# Patient Record
Sex: FEMALE | Race: BLACK OR AFRICAN AMERICAN | NOT HISPANIC OR LATINO | URBAN - METROPOLITAN AREA
[De-identification: names, ages, dates, MRNs, and addresses within clinical notes are randomized per-mention and may not be internally consistent; named-entity substitution may affect disease eponyms.]

---

## 2023-02-27 ENCOUNTER — OFFICE VISIT (OUTPATIENT)
Dept: URGENT CARE | Facility: CLINIC | Age: 23
End: 2023-02-27
Payer: MEDICAID

## 2023-02-27 VITALS
BODY MASS INDEX: 32.6 KG/M2 | HEIGHT: 63 IN | SYSTOLIC BLOOD PRESSURE: 119 MMHG | RESPIRATION RATE: 16 BRPM | WEIGHT: 184 LBS | OXYGEN SATURATION: 98 % | HEART RATE: 125 BPM | TEMPERATURE: 101 F | DIASTOLIC BLOOD PRESSURE: 64 MMHG

## 2023-02-27 DIAGNOSIS — R09.89 SYMPTOMS OF UPPER RESPIRATORY INFECTION (URI): Primary | ICD-10-CM

## 2023-02-27 LAB
CTP QC/QA: YES
FLUAV AG UPPER RESP QL IA.RAPID: NOT DETECTED
FLUBV AG UPPER RESP QL IA.RAPID: NOT DETECTED
MOLECULAR STREP A: POSITIVE
RSV A 5' UTR RNA NPH QL NAA+PROBE: NOT DETECTED
SARS-COV-2 RNA RESP QL NAA+PROBE: NOT DETECTED

## 2023-02-27 PROCEDURE — 0241U COVID/RSV/FLU A&B PCR: CPT | Performed by: NURSE PRACTITIONER

## 2023-02-27 PROCEDURE — 87651 STREP A DNA AMP PROBE: CPT | Mod: PBBFAC | Performed by: NURSE PRACTITIONER

## 2023-02-27 PROCEDURE — 99214 OFFICE O/P EST MOD 30 MIN: CPT | Mod: S$PBB,,, | Performed by: NURSE PRACTITIONER

## 2023-02-27 PROCEDURE — 99214 PR OFFICE/OUTPT VISIT, EST, LEVL IV, 30-39 MIN: ICD-10-PCS | Mod: S$PBB,,, | Performed by: NURSE PRACTITIONER

## 2023-02-27 PROCEDURE — 99214 OFFICE O/P EST MOD 30 MIN: CPT | Mod: PBBFAC | Performed by: NURSE PRACTITIONER

## 2023-02-27 RX ORDER — AMOXICILLIN 875 MG/1
875 TABLET, FILM COATED ORAL EVERY 12 HOURS
Qty: 20 TABLET | Refills: 0 | Status: SHIPPED | OUTPATIENT
Start: 2023-02-27 | End: 2023-03-09

## 2023-02-27 RX ORDER — DEXAMETHASONE SODIUM PHOSPHATE 4 MG/ML
8 INJECTION, SOLUTION INTRA-ARTICULAR; INTRALESIONAL; INTRAMUSCULAR; INTRAVENOUS; SOFT TISSUE ONCE
Status: COMPLETED | OUTPATIENT
Start: 2023-02-27 | End: 2023-02-27

## 2023-02-27 RX ORDER — MEDROXYPROGESTERONE ACETATE 150 MG/ML
INJECTION, SUSPENSION INTRAMUSCULAR
COMMUNITY
Start: 2023-02-06

## 2023-02-27 RX ORDER — KETOROLAC TROMETHAMINE 30 MG/ML
30 INJECTION, SOLUTION INTRAMUSCULAR; INTRAVENOUS
Status: COMPLETED | OUTPATIENT
Start: 2023-02-27 | End: 2023-02-27

## 2023-02-27 RX ADMIN — KETOROLAC TROMETHAMINE 30 MG: 30 INJECTION, SOLUTION INTRAMUSCULAR; INTRAVENOUS at 01:02

## 2023-02-27 RX ADMIN — DEXAMETHASONE SODIUM PHOSPHATE 8 MG: 4 INJECTION, SOLUTION INTRA-ARTICULAR; INTRALESIONAL; INTRAMUSCULAR; INTRAVENOUS; SOFT TISSUE at 02:02

## 2023-02-27 NOTE — LETTER
February 27, 2023      [facility]  [address] [address]  Phone: [phone]       Patient: Aamir Cordon   YOB: 2000  Date of Visit: 02/27/2023    To Whom It May Concern:    Asif Cordon  was at [facility] on 02/27/2023. The patient may return to work/school on 3/1/2023 with no restrictions. If you have any questions or concerns, or if I can be of further assistance, please do not hesitate to contact me.    Sincerely,    SHERIF Hodgson

## 2023-02-27 NOTE — PROGRESS NOTES
"Subjective:       Patient ID: Aamir Cordon is a 23 y.o. female.    Vitals:  height is 5' 3.39" (1.61 m) and weight is 83.5 kg (184 lb). Her oral temperature is 100.8 °F (38.2 °C) (abnormal). Her blood pressure is 119/64 and her pulse is 125 (abnormal). Her respiration is 16 and oxygen saturation is 98%.     Chief Complaint: Sore Throat, Headache, Generalized Body Aches, and Otalgia (R side ear pain)    HPI as stated in CC. not eating or drinking due to pain.   ROS    Objective:      Physical Exam   Constitutional: She is oriented to person, place, and time.  Non-toxic appearance. She appears ill. No distress. obesity  HENT:   Nose: Congestion present. No rhinorrhea.   Mouth/Throat: Oropharyngeal exudate and posterior oropharyngeal erythema present. Tonsils are 4+ on the right. Tonsils are 3+ on the left. Tonsillar exudate.   Eyes: Conjunctivae are normal.   Cardiovascular: Tachycardia present.   Pulmonary/Chest: Effort normal and breath sounds normal.   Musculoskeletal:      Cervical back: She exhibits tenderness.   Lymphadenopathy:     She has cervical adenopathy.   Neurological: She is alert and oriented to person, place, and time.   Skin: Skin is warm and diaphoretic.   Psychiatric: Her behavior is normal. Mood, judgment and thought content normal.       Assessment:       1. Symptoms of upper respiratory infection (URI)          Results for orders placed or performed in visit on 02/27/23   POCT Strep A, Molecular   Result Value Ref Range    Molecular Strep A, POC Positive (A) Negative     Acceptable Yes          Plan:         Symptoms of upper respiratory infection (URI)  -     POCT Strep A, Molecular  -     COVID/RSV/FLU A&B PCR    Other orders  -     amoxicillin (AMOXIL) 875 MG tablet; Take 1 tablet (875 mg total) by mouth every 12 (twelve) hours. for 10 days  Dispense: 20 tablet; Refill: 0  -     dexAMETHasone injection 8 mg  -     ketorolac injection 30 mg         **I am not diagnosing " you with a peritonsillar abscess. This information was included for you to read so that you can monitor yourself at home. If you are unable to swallow your own spit, you have fevers >102.5, severe pain after the medications you get today, call 911 or go to ER.**    - Start antibiotics today.  - Easy to swallow foods such as applesauce, smoothies, protein shakes, grits, oatmeal.  - Alternate OTC pain/fever reducers every 4 hours today and tomorrow.  - Out of school and/or work until you have taken 24 hours of antibiotics and are fever free for 24 hours.  - New tooth brush on Day of Week: Wednesday.  - Strep IS CONTAGIOUS and is spread by spit. Do not share food, drinks, utensils, cosmetics, or saliva in any way until you are done with antibiotics.

## 2023-02-27 NOTE — PATIENT INSTRUCTIONS
**I am not diagnosing you with a peritonsillar abscess. This information was included for you to read so that you can monitor yourself at home. If you are unable to swallow your own spit, you have fevers >102.5, severe pain after the medications you get today, call 911 or go to ER.**    - Start antibiotics today.  - Easy to swallow foods such as applesauce, smoothies, protein shakes, grits, oatmeal.  - Alternate OTC pain/fever reducers every 4 hours today and tomorrow.  - Out of school and/or work until you have taken 24 hours of antibiotics and are fever free for 24 hours.  - New tooth brush on Day of Week: Wednesday.  - Strep IS CONTAGIOUS and is spread by spit. Do not share food, drinks, utensils, cosmetics, or saliva in any way until you are done with antibiotics.

## 2023-09-07 ENCOUNTER — OFFICE VISIT (OUTPATIENT)
Dept: URGENT CARE | Facility: CLINIC | Age: 23
End: 2023-09-07
Payer: MEDICAID

## 2023-09-07 VITALS
BODY MASS INDEX: 30.3 KG/M2 | WEIGHT: 171 LBS | OXYGEN SATURATION: 100 % | HEART RATE: 94 BPM | DIASTOLIC BLOOD PRESSURE: 78 MMHG | HEIGHT: 63 IN | RESPIRATION RATE: 20 BRPM | SYSTOLIC BLOOD PRESSURE: 118 MMHG | TEMPERATURE: 99 F

## 2023-09-07 DIAGNOSIS — R30.9 PAINFUL URINATION: ICD-10-CM

## 2023-09-07 DIAGNOSIS — N30.00 ACUTE CYSTITIS WITHOUT HEMATURIA: Primary | ICD-10-CM

## 2023-09-07 LAB
APPEARANCE UR: ABNORMAL
BACTERIA #/AREA URNS AUTO: ABNORMAL /HPF
BILIRUB UR QL STRIP.AUTO: NEGATIVE
BILIRUB UR QL STRIP: POSITIVE
COLOR UR: ABNORMAL
GLUCOSE UR QL STRIP.AUTO: NORMAL
GLUCOSE UR QL STRIP: NEGATIVE
HYALINE CASTS #/AREA URNS LPF: ABNORMAL /LPF
KETONES UR QL STRIP.AUTO: ABNORMAL
KETONES UR QL STRIP: POSITIVE
LEUKOCYTE ESTERASE UR QL STRIP.AUTO: 500
LEUKOCYTE ESTERASE UR QL STRIP: POSITIVE
MUCOUS THREADS URNS QL MICRO: ABNORMAL /LPF
NITRITE UR QL STRIP.AUTO: ABNORMAL
NON-SQ EPI CELLS URNS QL MICRO: ABNORMAL /HPF
PH UR STRIP.AUTO: 6 [PH]
PH, POC UA: 6
POC BLOOD, URINE: POSITIVE
POC NITRATES, URINE: POSITIVE
PROT UR QL STRIP.AUTO: ABNORMAL
PROT UR QL STRIP: POSITIVE
RBC #/AREA URNS AUTO: >100 /HPF
RBC UR QL AUTO: ABNORMAL
SP GR UR STRIP.AUTO: 1.02 (ref 1–1.03)
SP GR UR STRIP: 1.03 (ref 1–1.03)
SQUAMOUS #/AREA URNS LPF: ABNORMAL /HPF
UNIDENT CRYS #/AREA URNS HPF: ABNORMAL /HPF
UROBILINOGEN UR STRIP-ACNC: 1 (ref 0.1–1.1)
UROBILINOGEN UR STRIP-ACNC: NORMAL
WBC #/AREA URNS AUTO: >100 /HPF
WBC CLUMPS UR QL AUTO: ABNORMAL /HPF

## 2023-09-07 PROCEDURE — 99214 OFFICE O/P EST MOD 30 MIN: CPT | Mod: PBBFAC | Performed by: STUDENT IN AN ORGANIZED HEALTH CARE EDUCATION/TRAINING PROGRAM

## 2023-09-07 PROCEDURE — 81003 URINALYSIS AUTO W/O SCOPE: CPT | Mod: PBBFAC | Performed by: STUDENT IN AN ORGANIZED HEALTH CARE EDUCATION/TRAINING PROGRAM

## 2023-09-07 PROCEDURE — 99213 PR OFFICE/OUTPT VISIT, EST, LEVL III, 20-29 MIN: ICD-10-PCS | Mod: S$PBB,,, | Performed by: STUDENT IN AN ORGANIZED HEALTH CARE EDUCATION/TRAINING PROGRAM

## 2023-09-07 PROCEDURE — 87088 URINE BACTERIA CULTURE: CPT | Performed by: STUDENT IN AN ORGANIZED HEALTH CARE EDUCATION/TRAINING PROGRAM

## 2023-09-07 PROCEDURE — 87186 SC STD MICRODIL/AGAR DIL: CPT | Performed by: STUDENT IN AN ORGANIZED HEALTH CARE EDUCATION/TRAINING PROGRAM

## 2023-09-07 PROCEDURE — 99213 OFFICE O/P EST LOW 20 MIN: CPT | Mod: S$PBB,,, | Performed by: STUDENT IN AN ORGANIZED HEALTH CARE EDUCATION/TRAINING PROGRAM

## 2023-09-07 PROCEDURE — 81001 URINALYSIS AUTO W/SCOPE: CPT | Performed by: STUDENT IN AN ORGANIZED HEALTH CARE EDUCATION/TRAINING PROGRAM

## 2023-09-07 RX ORDER — NITROFURANTOIN 25; 75 MG/1; MG/1
100 CAPSULE ORAL 2 TIMES DAILY
Qty: 14 CAPSULE | Refills: 0 | Status: SHIPPED | OUTPATIENT
Start: 2023-09-07 | End: 2023-09-14

## 2023-09-08 NOTE — PROGRESS NOTES
"Subjective:      Patient ID: Aamir Cordon is a 23 y.o. female.    Vitals:  height is 5' 3.39" (1.61 m) and weight is 77.6 kg (171 lb). Her oral temperature is 98.6 °F (37 °C). Her blood pressure is 118/78 and her pulse is 94. Her respiration is 20 and oxygen saturation is 100%.     Chief Complaint: Dysuria (Urgency, frequency and pain x 1 day) and Abdominal Pain (Lower abdominal pressure)    Dysuria     Abdominal Pain  Associated symptoms include dysuria.     Aamir Cordon is a 23-year-old female presenting to the urgent care clinic today with urinary urgency, frequency and pain that has been going on for the past 1 day.  She is also complaining of lower abdominal pain and pressure.  She denies any back pain.  She denies any CVA tenderness.  She denies any fevers or chills.  She is been able to tolerate p.o..  She states that she is been drinking plenty of, however she is having burning when she is urinating.    Gastrointestinal:  Positive for abdominal pain.   Genitourinary:  Positive for dysuria.      Objective:     Physical Exam   Constitutional: She is oriented to person, place, and time. She appears well-developed.   HENT:   Head: Normocephalic and atraumatic.   Ears:   Right Ear: External ear normal.   Left Ear: External ear normal.   Nose: Nose normal.   Mouth/Throat: Mucous membranes are normal.   Eyes: Conjunctivae and lids are normal.   Neck: Trachea normal. Neck supple.   Cardiovascular: Normal rate, regular rhythm and normal heart sounds.   Pulmonary/Chest: Effort normal and breath sounds normal. No respiratory distress.   Abdominal: Normal appearance and bowel sounds are normal. She exhibits no distension and no mass. Soft. There is no abdominal tenderness (Suprapubic tenderness to palpation).   Musculoskeletal: Normal range of motion.         General: Normal range of motion.   Neurological: She is alert and oriented to person, place, and time. She has normal strength.   Skin: Skin is warm, " dry, intact, not diaphoretic and not pale.   Psychiatric: Her speech is normal and behavior is normal. Judgment and thought content normal.   Nursing note and vitals reviewed.      Assessment:     1. Acute cystitis without hematuria    2. Painful urination        Plan:     Patient presents to the urgent care clinic today with urinary frequency and dysuria.  Urine dipstick done in the office with positive nitrites and leuk esterase..  We will initiate treatment with Macrobid and send urine for a urine culture.ER going precautions discussed.       Acute cystitis without hematuria  -     Urinalysis, Reflex to Urine Culture  -     nitrofurantoin, macrocrystal-monohydrate, (MACROBID) 100 MG capsule; Take 1 capsule (100 mg total) by mouth 2 (two) times daily. for 7 days  Dispense: 14 capsule; Refill: 0    Painful urination  -     POCT Urinalysis, Dipstick, Automated, W/O Scope      This note is dictated using the M*Modal Fluency Direct word recognition program. There are word recognition mistakes that are occasionally missed on review.    Jean Marie Gallegos MD

## 2023-09-10 LAB — BACTERIA UR CULT: ABNORMAL

## 2023-10-03 ENCOUNTER — OFFICE VISIT (OUTPATIENT)
Dept: URGENT CARE | Facility: CLINIC | Age: 23
End: 2023-10-03
Payer: MEDICAID

## 2023-10-03 VITALS
HEIGHT: 63 IN | HEART RATE: 95 BPM | BODY MASS INDEX: 30.83 KG/M2 | RESPIRATION RATE: 18 BRPM | WEIGHT: 174 LBS | OXYGEN SATURATION: 99 % | SYSTOLIC BLOOD PRESSURE: 143 MMHG | DIASTOLIC BLOOD PRESSURE: 89 MMHG | TEMPERATURE: 99 F

## 2023-10-03 DIAGNOSIS — R11.10 VOMITING, UNSPECIFIED VOMITING TYPE, UNSPECIFIED WHETHER NAUSEA PRESENT: ICD-10-CM

## 2023-10-03 DIAGNOSIS — J02.0 STREPTOCOCCAL PHARYNGITIS: Primary | ICD-10-CM

## 2023-10-03 DIAGNOSIS — R09.81 NASAL CONGESTION: ICD-10-CM

## 2023-10-03 LAB
CTP QC/QA: YES
MOLECULAR STREP A: POSITIVE
POC MOLECULAR INFLUENZA A AGN: NEGATIVE
POC MOLECULAR INFLUENZA B AGN: NEGATIVE
SARS-COV-2 RDRP RESP QL NAA+PROBE: NEGATIVE

## 2023-10-03 PROCEDURE — 87502 INFLUENZA DNA AMP PROBE: CPT | Mod: PBBFAC | Performed by: FAMILY MEDICINE

## 2023-10-03 PROCEDURE — 99213 PR OFFICE/OUTPT VISIT, EST, LEVL III, 20-29 MIN: ICD-10-PCS | Mod: S$PBB,,, | Performed by: FAMILY MEDICINE

## 2023-10-03 PROCEDURE — 99213 OFFICE O/P EST LOW 20 MIN: CPT | Mod: PBBFAC | Performed by: FAMILY MEDICINE

## 2023-10-03 PROCEDURE — 99213 OFFICE O/P EST LOW 20 MIN: CPT | Mod: S$PBB,,, | Performed by: FAMILY MEDICINE

## 2023-10-03 PROCEDURE — 87635 SARS-COV-2 COVID-19 AMP PRB: CPT | Mod: PBBFAC | Performed by: FAMILY MEDICINE

## 2023-10-03 PROCEDURE — 87651 STREP A DNA AMP PROBE: CPT | Mod: PBBFAC | Performed by: FAMILY MEDICINE

## 2023-10-03 RX ORDER — AMOXICILLIN 875 MG/1
875 TABLET, FILM COATED ORAL EVERY 12 HOURS
Qty: 20 TABLET | Refills: 0 | Status: SHIPPED | OUTPATIENT
Start: 2023-10-03 | End: 2023-10-13

## 2023-10-03 NOTE — PROGRESS NOTES
"Subjective:       Patient ID: Aamir Cordon is a 23 y.o. female.    Vitals:  height is 5' 3" (1.6 m) and weight is 78.9 kg (174 lb). Her oral temperature is 98.5 °F (36.9 °C). Her blood pressure is 143/89 (abnormal) and her pulse is 95. Her respiration is 18 and oxygen saturation is 99%.     Chief Complaint: URI (Headache, vomiting, nasal congestion, R side ear pain started today.)      URI   The current episode started today. There has been no fever. Associated symptoms include congestion, ear pain and rhinorrhea. Pertinent negatives include no joint pain, neck pain, sinus pain, sore throat or wheezing.         HENT:  Positive for ear pain and congestion. Negative for sinus pain and sore throat.    Neck: Negative for neck pain.   Respiratory:  Negative for wheezing.          Objective:   Physical Exam   Constitutional: She appears well-developed.  Non-toxic appearance. She does not appear ill. No distress.   HENT:   Head: Atraumatic.   Nose: No purulent discharge. Right sinus exhibits no maxillary sinus tenderness and no frontal sinus tenderness. Left sinus exhibits no maxillary sinus tenderness and no frontal sinus tenderness.   Mouth/Throat: Uvula is midline and mucous membranes are normal. No oropharyngeal exudate, posterior oropharyngeal edema, posterior oropharyngeal erythema or tonsillar abscesses.   Eyes: Right eye exhibits no discharge. Left eye exhibits no discharge. Extraocular movement intact   Neck: Neck supple. No neck rigidity present.   Cardiovascular: Regular rhythm.   Pulmonary/Chest: Effort normal and breath sounds normal. No respiratory distress. She has no wheezes. She has no rales.   Lymphadenopathy:     She has no cervical adenopathy.   Neurological: She is alert.   Skin: Skin is warm, dry and not diaphoretic.   Psychiatric: Her behavior is normal.   Nursing note and vitals reviewed.        Assessment:     1. Streptococcal pharyngitis    2. Vomiting, unspecified vomiting type, unspecified " whether nausea present    3. Nasal congestion            Plan:   Take medications as directed.  Discussed contagious precautions.      Please encourage fluids and use over-the-counter medications for symptoms as needed.  Monitor closely.  Please follow instructions on patient education material.  Return to urgent care in 2-3 days if symptoms are not improving. Seek care immediately if new or worsening symptoms develop.      Streptococcal pharyngitis  -     amoxicillin (AMOXIL) 875 MG tablet; Take 1 tablet (875 mg total) by mouth every 12 (twelve) hours. for 10 days  Dispense: 20 tablet; Refill: 0    Vomiting, unspecified vomiting type, unspecified whether nausea present  -     POCT COVID-19 Rapid Screening  -     POCT Strep A, Molecular  -     POCT Influenza A/B Molecular    Nasal congestion  -     POCT COVID-19 Rapid Screening  -     POCT Influenza A/B Molecular        Please note: This chart was completed via voice to text dictation. It may contain typographical/word recognition errors. If there are any questions, please contact the provider for final clarification.

## 2023-10-19 ENCOUNTER — OFFICE VISIT (OUTPATIENT)
Dept: URGENT CARE | Facility: CLINIC | Age: 23
End: 2023-10-19
Payer: MEDICAID

## 2023-10-19 VITALS
OXYGEN SATURATION: 99 % | HEIGHT: 62 IN | TEMPERATURE: 99 F | WEIGHT: 177 LBS | RESPIRATION RATE: 18 BRPM | SYSTOLIC BLOOD PRESSURE: 130 MMHG | DIASTOLIC BLOOD PRESSURE: 85 MMHG | HEART RATE: 101 BPM | BODY MASS INDEX: 32.57 KG/M2

## 2023-10-19 DIAGNOSIS — J02.0 STREP PHARYNGITIS: ICD-10-CM

## 2023-10-19 DIAGNOSIS — R09.89 SYMPTOMS OF UPPER RESPIRATORY INFECTION (URI): ICD-10-CM

## 2023-10-19 DIAGNOSIS — J10.1 INFLUENZA A: Primary | ICD-10-CM

## 2023-10-19 LAB
CTP QC/QA: YES
MOLECULAR STREP A: POSITIVE
POC MOLECULAR INFLUENZA A AGN: POSITIVE
POC MOLECULAR INFLUENZA B AGN: NEGATIVE
SARS-COV-2 RDRP RESP QL NAA+PROBE: NEGATIVE

## 2023-10-19 PROCEDURE — 99214 OFFICE O/P EST MOD 30 MIN: CPT | Mod: PBBFAC | Performed by: NURSE PRACTITIONER

## 2023-10-19 PROCEDURE — 99214 OFFICE O/P EST MOD 30 MIN: CPT | Mod: S$PBB,,, | Performed by: NURSE PRACTITIONER

## 2023-10-19 PROCEDURE — 99214 PR OFFICE/OUTPT VISIT, EST, LEVL IV, 30-39 MIN: ICD-10-PCS | Mod: S$PBB,,, | Performed by: NURSE PRACTITIONER

## 2023-10-19 PROCEDURE — 87502 INFLUENZA DNA AMP PROBE: CPT | Mod: PBBFAC | Performed by: NURSE PRACTITIONER

## 2023-10-19 PROCEDURE — 87635 SARS-COV-2 COVID-19 AMP PRB: CPT | Mod: PBBFAC | Performed by: NURSE PRACTITIONER

## 2023-10-19 PROCEDURE — 87651 STREP A DNA AMP PROBE: CPT | Mod: PBBFAC | Performed by: NURSE PRACTITIONER

## 2023-10-19 RX ORDER — OSELTAMIVIR PHOSPHATE 75 MG/1
75 CAPSULE ORAL 2 TIMES DAILY
Qty: 10 CAPSULE | Refills: 0 | Status: SHIPPED | OUTPATIENT
Start: 2023-10-19 | End: 2023-10-24

## 2023-10-19 RX ORDER — AMOXICILLIN 875 MG/1
875 TABLET, FILM COATED ORAL EVERY 12 HOURS
Qty: 20 TABLET | Refills: 0 | Status: SHIPPED | OUTPATIENT
Start: 2023-10-19 | End: 2023-10-29

## 2023-10-19 NOTE — PROGRESS NOTES
"Subjective:      Patient ID: Aamir Cordon is a 23 y.o. female.    Vitals:  height is 5' 2" (1.575 m) and weight is 80.3 kg (177 lb). Her oral temperature is 98.7 °F (37.1 °C). Her blood pressure is 130/85 and her pulse is 101. Her respiration is 18 and oxygen saturation is 99%.     Chief Complaint: URI (Patient reports runny nose, sore throat, congestion, body aches x 2/Roomates have Flu and strep)    URI      As stated in CC.  Pt has strep [id number]/15631 reports taking all medication as prescribed. Has not taken any medication for treatment of symptoms. Denies cough chest pain, shortness of breath, headache or dizziness. LMP - on depo, last does one week ago.    Skin:  Negative for erythema.      Objective:     Physical Exam   Constitutional: She is oriented to person, place, and time. She appears well-developed.  Non-toxic appearance. She does not appear ill. No distress.   HENT:   Head: Normocephalic and atraumatic.   Ears:   Right Ear: Tympanic membrane normal.   Left Ear: Tympanic membrane normal.   Nose: Congestion present. No purulent discharge. Right sinus exhibits no maxillary sinus tenderness and no frontal sinus tenderness. Left sinus exhibits no maxillary sinus tenderness and no frontal sinus tenderness.   Mouth/Throat: Uvula is midline and mucous membranes are normal. Posterior oropharyngeal erythema present. No oropharyngeal exudate, posterior oropharyngeal edema or tonsillar abscesses.   Eyes: Conjunctivae are normal. Pupils are equal, round, and reactive to light. Right eye exhibits no discharge. Left eye exhibits no discharge. Extraocular movement intact   Neck: Neck supple. No neck rigidity present.   Cardiovascular: Normal rate, regular rhythm and normal pulses.   Pulmonary/Chest: Effort normal and breath sounds normal. No respiratory distress. She has no wheezes. She has no rales.   Abdominal: Normal appearance and bowel sounds are normal. She exhibits distension. Soft.   Musculoskeletal: Normal " range of motion.         General: Normal range of motion.   Lymphadenopathy:     She has no cervical adenopathy.   Neurological: no focal deficit. She is alert and oriented to person, place, and time.   Skin: Skin is warm, dry, not diaphoretic and no rash. Capillary refill takes less than 2 seconds. No erythema   Psychiatric: Her behavior is normal. Mood, judgment and thought content normal.   Nursing note and vitals reviewed.      Assessment:     1. Influenza A    2. Strep pharyngitis    3. Symptoms of upper respiratory infection (URI)      Results for orders placed or performed in visit on 10/19/23   POCT Strep A, Molecular   Result Value Ref Range    Molecular Strep A, POC Positive (A) Negative     Acceptable Yes    POCT COVID-19 Rapid Screening   Result Value Ref Range    POC Rapid COVID Negative Negative     Acceptable Yes    POCT Influenza A/B MOLECULAR   Result Value Ref Range    POC Molecular Influenza A Ag Positive (A) Negative, Not Reported    POC Molecular Influenza B Ag Negative Negative, Not Reported     Acceptable Yes          Plan:   ER precautions given  - Tylenol or Motrin for pain/fever  - Start antibiotics today.  - Easy to swallow foods such as applesauce, smoothies, protein shakes, grits, oatmeal.  Go to the ER if you experience chest pain with shortness of breath, shortness of breath when moving around your house, high fevers 103.0+, excessive vomiting/diarrhea, or general distress.    Influenza A    Strep pharyngitis    Symptoms of upper respiratory infection (URI)  -     POCT Strep A, Molecular  -     POCT COVID-19 Rapid Screening  -     POCT Influenza A/B MOLECULAR    Other orders  -     amoxicillin (AMOXIL) 875 MG tablet; Take 1 tablet (875 mg total) by mouth every 12 (twelve) hours. for 10 days  Dispense: 20 tablet; Refill: 0  -     oseltamivir (TAMIFLU) 75 MG capsule; Take 1 capsule (75 mg total) by mouth 2 (two) times daily. for 5 days   Dispense: 10 capsule; Refill: 0

## 2023-10-19 NOTE — PATIENT INSTRUCTIONS
Please follow instructions on patient education material.      Return to urgent care in 2 to 3 days if symptoms are not improving, immediately if you develop any new or worsening symptoms.     - OTC cold/flu products as desired for symptoms  - Plenty of fluids  - Humidified air  - Tylenol or Motrin for pain/fever    - Start antibiotics today.  - Easy to swallow foods such as applesauce, smoothies, protein shakes, grits, oatmeal.  - Alternate OTC pain/fever reducers every 4 hours today and tomorrow.  - Out of school and/or work until you have taken 24 hours of antibiotics and are fever free for 24 hours.  - New tooth brush on Day of Week: Saturday.  - Strep IS CONTAGIOUS and is spread by spit. Do not share food, drinks, utensils, cosmetics, or saliva in any way until you are done with antibiotics.      Go to the ER if you experience chest pain with shortness of breath, shortness of breath when moving around your house, high fevers 103.0+, excessive vomiting/diarrhea, or general distress.

## 2024-04-23 ENCOUNTER — OFFICE VISIT (OUTPATIENT)
Dept: URGENT CARE | Facility: CLINIC | Age: 24
End: 2024-04-23
Payer: MEDICAID

## 2024-04-23 VITALS
HEIGHT: 62 IN | WEIGHT: 186.31 LBS | TEMPERATURE: 100 F | HEART RATE: 106 BPM | SYSTOLIC BLOOD PRESSURE: 151 MMHG | DIASTOLIC BLOOD PRESSURE: 91 MMHG | BODY MASS INDEX: 34.29 KG/M2 | OXYGEN SATURATION: 99 % | RESPIRATION RATE: 18 BRPM

## 2024-04-23 DIAGNOSIS — J02.9 SORE THROAT: ICD-10-CM

## 2024-04-23 DIAGNOSIS — J06.9 UPPER RESPIRATORY TRACT INFECTION, UNSPECIFIED TYPE: Primary | ICD-10-CM

## 2024-04-23 LAB
CTP QC/QA: YES
MOLECULAR STREP A: NEGATIVE

## 2024-04-23 PROCEDURE — 87651 STREP A DNA AMP PROBE: CPT | Mod: PBBFAC | Performed by: NURSE PRACTITIONER

## 2024-04-23 PROCEDURE — 99213 OFFICE O/P EST LOW 20 MIN: CPT | Mod: S$PBB,,, | Performed by: NURSE PRACTITIONER

## 2024-04-23 PROCEDURE — 99215 OFFICE O/P EST HI 40 MIN: CPT | Mod: PBBFAC | Performed by: NURSE PRACTITIONER

## 2024-04-23 RX ORDER — PROMETHAZINE HYDROCHLORIDE AND DEXTROMETHORPHAN HYDROBROMIDE 6.25; 15 MG/5ML; MG/5ML
10 SYRUP ORAL
Qty: 120 ML | Refills: 0 | Status: SHIPPED | OUTPATIENT
Start: 2024-04-23

## 2024-04-23 RX ORDER — ALBUTEROL SULFATE 90 UG/1
2 AEROSOL, METERED RESPIRATORY (INHALATION) EVERY 6 HOURS PRN
Qty: 1 G | Refills: 0 | Status: SHIPPED | OUTPATIENT
Start: 2024-04-23

## 2024-04-23 RX ORDER — FLUTICASONE PROPIONATE 50 MCG
2 SPRAY, SUSPENSION (ML) NASAL DAILY
Qty: 18.2 ML | Refills: 0 | Status: SHIPPED | OUTPATIENT
Start: 2024-04-23

## 2024-04-23 RX ORDER — LORATADINE 10 MG/1
10 TABLET ORAL DAILY
Qty: 30 TABLET | Refills: 0 | Status: SHIPPED | OUTPATIENT
Start: 2024-04-23 | End: 2024-05-23

## 2024-04-23 NOTE — PROGRESS NOTES
"Subjective:      Patient ID: Aamir Cordon is a 24 y.o. female.    Vitals:  height is 5' 2" (1.575 m) and weight is 84.5 kg (186 lb 4.6 oz). Her temperature is 99.6 °F (37.6 °C). Her blood pressure is 151/91 (abnormal) and her pulse is 106. Her respiration is 18 and oxygen saturation is 99%.     Chief Complaint: URI (Sore throat, cough x 2wks )    URI      As stated in chief complaint.  Patient denies taking any medication for treatment of symptoms.  ROS   Objective:     Physical Exam   Constitutional: She is oriented to person, place, and time. She appears well-developed.  Non-toxic appearance. She does not appear ill. No distress. obesity  HENT:   Head: Normocephalic and atraumatic.   Ears:   Right Ear: Tympanic membrane normal. Tympanic membrane is not injected, not erythematous, not retracted and not bulging. No middle ear effusion.   Left Ear: No tenderness. Tympanic membrane is not injected, not erythematous, not retracted and not bulging. A middle ear effusion is present.   Nose: Rhinorrhea and congestion present. No purulent discharge. Right sinus exhibits no maxillary sinus tenderness and no frontal sinus tenderness. Left sinus exhibits no maxillary sinus tenderness and no frontal sinus tenderness.   Mouth/Throat: Uvula is midline. Mucous membranes are moist. Posterior oropharyngeal erythema present. No oropharyngeal exudate.   Eyes: Conjunctivae are normal. Right eye exhibits no discharge. Left eye exhibits no discharge. Extraocular movement intact   Neck: Neck supple. No neck rigidity present.   Cardiovascular: Normal rate, regular rhythm and normal pulses.   Pulmonary/Chest: Effort normal and breath sounds normal. No stridor. No respiratory distress. She has no wheezes. She has no rhonchi. She has no rales. She exhibits no tenderness.   Abdominal: Normal appearance and bowel sounds are normal. She exhibits no distension. Soft. There is no abdominal tenderness. There is no guarding, no left CVA " tenderness and no right CVA tenderness.   Musculoskeletal: Normal range of motion.         General: Normal range of motion.      Cervical back: She exhibits no tenderness.   Lymphadenopathy:     She has no cervical adenopathy.   Neurological: She is alert and oriented to person, place, and time.   Skin: Skin is warm, dry and not diaphoretic. Capillary refill takes less than 2 seconds.   Psychiatric: Her behavior is normal. Mood, judgment and thought content normal.   Nursing note and vitals reviewed.      Assessment:     1. Upper respiratory tract infection, unspecified type    2. Sore throat      Results for orders placed or performed in visit on 04/23/24   POCT Strep A, Molecular   Result Value Ref Range    Molecular Strep A, POC Negative Negative     Acceptable Yes          Plan:   ER precautions given  - OTC cold/flu products as desired for symptoms  - Plenty of fluids  - Humidified air  - Tylenol or Motrin for pain/fever    Upper respiratory tract infection, unspecified type    Sore throat  -     POCT Strep A, Molecular  -     promethazine-dextromethorphan (PROMETHAZINE-DM) 6.25-15 mg/5 mL Syrp; Take 10 mLs by mouth every 6 to 8 hours as needed (Cough). May cause sedation.  Do not drive or operate heavy machinery after taking this medication.  Dispense: 120 mL; Refill: 0  -     albuterol (PROVENTIL HFA) 90 mcg/actuation inhaler; Inhale 2 puffs into the lungs every 6 (six) hours as needed for Wheezing or Shortness of Breath. Rescue  Dispense: 1 g; Refill: 0  -     loratadine (CLARITIN) 10 mg tablet; Take 1 tablet (10 mg total) by mouth once daily.  Dispense: 30 tablet; Refill: 0    Other orders  -     fluticasone propionate (FLONASE) 50 mcg/actuation nasal spray; 2 sprays (100 mcg total) by Each Nostril route once daily.  Dispense: 18.2 mL; Refill: 0          Medical Decision Making:   Differential Diagnosis:   Bronchitis, seasonal allergies

## 2024-04-23 NOTE — PATIENT INSTRUCTIONS
Please follow instructions on patient education material.      Return to urgent care in 2 to 3 days if symptoms are not improving, immediately if you develop any new or worsening symptoms.     - OTC cold/flu products as desired for symptoms  - Plenty of fluids  - Humidified air  - Tylenol or Motrin for pain/fever      Go to the ER if you experience chest pain with shortness of breath, shortness of breath when moving around your house, high fevers 103.0+, excessive vomiting/diarrhea, or general distress.

## 2024-11-14 ENCOUNTER — OFFICE VISIT (OUTPATIENT)
Dept: URGENT CARE | Facility: CLINIC | Age: 24
End: 2024-11-14
Payer: MEDICAID

## 2024-11-14 ENCOUNTER — HOSPITAL ENCOUNTER (EMERGENCY)
Facility: HOSPITAL | Age: 24
Discharge: HOME | End: 2024-11-14
Attending: EMERGENCY MEDICINE
Payer: MEDICAID

## 2024-11-14 VITALS
WEIGHT: 193.44 LBS | DIASTOLIC BLOOD PRESSURE: 94 MMHG | OXYGEN SATURATION: 98 % | HEART RATE: 100 BPM | TEMPERATURE: 98 F | BODY MASS INDEX: 35.6 KG/M2 | RESPIRATION RATE: 20 BRPM | HEIGHT: 62 IN | SYSTOLIC BLOOD PRESSURE: 150 MMHG

## 2024-11-14 VITALS
RESPIRATION RATE: 20 BRPM | SYSTOLIC BLOOD PRESSURE: 139 MMHG | OXYGEN SATURATION: 100 % | WEIGHT: 193 LBS | DIASTOLIC BLOOD PRESSURE: 86 MMHG | BODY MASS INDEX: 35.51 KG/M2 | HEIGHT: 62 IN | TEMPERATURE: 98 F | HEART RATE: 91 BPM

## 2024-11-14 DIAGNOSIS — R09.89 SYMPTOMS OF UPPER RESPIRATORY INFECTION (URI): ICD-10-CM

## 2024-11-14 DIAGNOSIS — Z13.9 ENCOUNTER FOR MEDICAL SCREENING EXAMINATION: Primary | ICD-10-CM

## 2024-11-14 DIAGNOSIS — R51.9 GENERALIZED HEADACHE: ICD-10-CM

## 2024-11-14 DIAGNOSIS — U07.1 COVID-19: Primary | ICD-10-CM

## 2024-11-14 LAB
CTP QC/QA: YES
SARS-COV-2 AG RESP QL IA.RAPID: POSITIVE

## 2024-11-14 PROCEDURE — 99281 EMR DPT VST MAYX REQ PHY/QHP: CPT | Mod: 27

## 2024-11-14 PROCEDURE — 99214 OFFICE O/P EST MOD 30 MIN: CPT | Mod: S$PBB,,,

## 2024-11-14 PROCEDURE — 87811 SARS-COV-2 COVID19 W/OPTIC: CPT | Mod: PBBFAC

## 2024-11-14 PROCEDURE — 99215 OFFICE O/P EST HI 40 MIN: CPT | Mod: PBBFAC

## 2024-11-14 RX ORDER — BUTALBITAL, ACETAMINOPHEN AND CAFFEINE 50; 325; 40 MG/1; MG/1; MG/1
1 TABLET ORAL EVERY 6 HOURS PRN
Qty: 20 TABLET | Refills: 0 | Status: SHIPPED | OUTPATIENT
Start: 2024-11-14 | End: 2024-11-19

## 2024-11-14 NOTE — ED PROVIDER NOTES
Encounter Date: 11/14/2024       History     Chief Complaint   Patient presents with    Headache    Sinus Congestion     Presents from home with c/o Covid Like symptoms; HA, sinus congestion, upset stomach. Requesting Covid testing.     Mild URI symptoms. Requests to be screened for covid. No cp or shortness of breath.      Review of patient's allergies indicates:  No Known Allergies  History reviewed. No pertinent past medical history.  History reviewed. No pertinent surgical history.  Family History   Problem Relation Name Age of Onset    No Known Problems Mother      No Known Problems Father       Social History     Tobacco Use    Smoking status: Never    Smokeless tobacco: Never   Substance Use Topics    Alcohol use: Not Currently     Comment: occ    Drug use: Never     Review of Systems   Constitutional:  Positive for fever.   HENT:  Positive for congestion and sore throat.    Respiratory:  Positive for cough. Negative for shortness of breath.    Cardiovascular:  Negative for chest pain.   Gastrointestinal:  Negative for nausea.   Genitourinary:  Negative for dysuria.   Musculoskeletal:  Negative for back pain.   Skin:  Negative for rash.   Neurological:  Negative for weakness.   Hematological:  Does not bruise/bleed easily.   All other systems reviewed and are negative.      Physical Exam     Initial Vitals [11/14/24 1047]   BP Pulse Resp Temp SpO2   (!) 150/94 100 20 98.1 °F (36.7 °C) 98 %      MAP       --         Physical Exam    Nursing note and vitals reviewed.  Constitutional: She appears well-developed and well-nourished.   HENT:   Head: Normocephalic and atraumatic.   Neck: Neck supple.   Normal range of motion.  Cardiovascular:  Normal rate, regular rhythm and normal heart sounds.           Pulmonary/Chest: Effort normal and breath sounds normal.   Abdominal: Abdomen is flat.   Musculoskeletal:         General: Normal range of motion.      Cervical back: Normal range of motion and neck supple.      Neurological: She is alert. She has normal strength.   Skin: Skin is warm and dry.   Psychiatric: She has a normal mood and affect.         ED Course   Procedures  Labs Reviewed - No data to display       Imaging Results    None          Medications - No data to display  Medical Decision Making  An emergency medical screening examination have been completed for this patient.  After completed vital signs and initial emergency assessment, no acute, unstable, medical emergency condition have been identified.  Patient has been informed about alternatives options for care including urgent care facilities, primary care provider office and virtual urgent care services (Telemedicine).  Pt was also provided with the option to wait for non-emergency care in the Emergency Department. Pt understood that no acute emergency is identified and understood the signs and symptoms that could require return to the emergency department for new evaluation.  Pt is discharged from the emergency department in stable condition.                                         Clinical Impression:  Final diagnoses:  [Z13.9] Encounter for medical screening examination (Primary)          ED Disposition Condition    Discharge Stable          ED Prescriptions    None       Follow-up Information       Follow up With Specialties Details Why Contact Info    follow up in urgent care or with your pcp        [facility] Emergency Medicine  If symptoms worsen [address] [id number] [phone]             Philippe Higuera, [facility]  11/14/24 1050

## 2024-11-14 NOTE — PROGRESS NOTES
"Subjective:       Patient ID: Aamir Cordon is a 24 y.o. female.    Vitals:  height is 5' 2" (1.575 m) and weight is 87.5 kg (193 lb). Her oral temperature is 98.2 °F (36.8 °C). Her blood pressure is 139/86 and her pulse is 91. Her respiration is 20 and oxygen saturation is 100%.     Chief Complaint: URI (Cough, nasal congestion, chest congestion and headache. Exposure to Covid)    24-year-old  female presents to the clinic with toddler daughter, she reports symptoms x 3 days ago which are unchanged.  States has COVID outbreak at home.  Has tried over-the-counter pain reducers with minimal improvement.         Constitution: Negative for fever.   HENT:  Positive for congestion. Negative for ear pain and sore throat.    Respiratory:  Positive for chest tightness and cough.    Neurological:  Positive for headaches. Negative for dizziness and history of migraines.       Objective:      Physical Exam   Constitutional: She is oriented to person, place, and time. She is cooperative. She is easily aroused.  Non-toxic appearance. She does not appear ill. overweightawake  HENT:   Head: Normocephalic and atraumatic.   Ears:   Right Ear: Tympanic membrane and ear canal normal.   Left Ear: Tympanic membrane and ear canal normal.   Nose: Nose normal.   Mouth/Throat: Uvula is midline, oropharynx is clear and moist and mucous membranes are normal.   Eyes: Conjunctivae and lids are normal. Pupils are equal, round, and reactive to light. Extraocular movement intact   Neck: Neck supple.   Cardiovascular: Normal rate.   Pulmonary/Chest: Effort normal and breath sounds normal.   Neurological: She is alert, oriented to person, place, and time and easily aroused. Gait normal. GCS eye subscore is 4. GCS verbal subscore is 5. GCS motor subscore is 6.   Skin: Skin is warm, dry, intact and not diaphoretic. Capillary refill takes less than 2 seconds.   Psychiatric: Her speech is normal and behavior is normal.   Nursing " note and vitals reviewed.        Assessment:       1. COVID-19    2. Symptoms of upper respiratory infection (URI)    3. Generalized headache          Plan:     COVID test is positive today in clinic, discussed CDC current updated guidelines, encouraged patient to remain hydrated, may alternate Fioricet with ibuprofen as needed.  Return to clinic or follow up with PCP if symptoms does not improve in 1 week.    COVID-19    Symptoms of upper respiratory infection (URI)  -     SARS Coronavirus 2 Antigen, POCT Manual Read    Generalized headache  -     butalbital-acetaminophen-caffeine -40 mg (FIORICET, ESGIC) -40 mg per tablet; Take 1 tablet by mouth every 6 (six) hours as needed for Headaches.  Dispense: 20 tablet; Refill: 0           Results for orders placed or performed in visit on 11/14/24   SARS Coronavirus 2 Antigen, POCT Manual Read    Collection Time: 11/14/24 12:38 PM   Result Value Ref Range    SARS Coronavirus 2 Antigen Positive (A) Negative     Acceptable Yes

## 2025-08-18 ENCOUNTER — HOSPITAL ENCOUNTER (EMERGENCY)
Facility: HOSPITAL | Age: 25
Discharge: HOME | End: 2025-08-18
Attending: EMERGENCY MEDICINE
Payer: MEDICAID

## 2025-08-18 VITALS
HEIGHT: 64 IN | DIASTOLIC BLOOD PRESSURE: 88 MMHG | TEMPERATURE: 98 F | BODY MASS INDEX: 35.85 KG/M2 | HEART RATE: 90 BPM | OXYGEN SATURATION: 99 % | RESPIRATION RATE: 18 BRPM | SYSTOLIC BLOOD PRESSURE: 145 MMHG | WEIGHT: 210 LBS

## 2025-08-18 DIAGNOSIS — K52.9 GASTROENTERITIS: Primary | ICD-10-CM

## 2025-08-18 LAB
ALBUMIN SERPL-MCNC: 3.6 G/DL (ref 3.5–5)
ALBUMIN/GLOB SERPL: 0.9 RATIO (ref 1.1–2)
ALP SERPL-CCNC: 81 UNIT/L (ref 40–150)
ALT SERPL-CCNC: 16 UNIT/L (ref 0–55)
ANION GAP SERPL CALC-SCNC: 9 MEQ/L
AST SERPL-CCNC: 16 UNIT/L (ref 11–45)
B-HCG UR QL: NEGATIVE
BACTERIA #/AREA URNS AUTO: ABNORMAL /HPF
BASOPHILS # BLD AUTO: 0.04 X10(3)/MCL
BASOPHILS NFR BLD AUTO: 0.7 %
BILIRUB SERPL-MCNC: 0.4 MG/DL
BILIRUB UR QL STRIP.AUTO: NEGATIVE
BUN SERPL-MCNC: 10.5 MG/DL (ref 7–18.7)
CALCIUM SERPL-MCNC: 9.1 MG/DL (ref 8.4–10.2)
CHLORIDE SERPL-SCNC: 111 MMOL/L (ref 98–107)
CLARITY UR: CLEAR
CO2 SERPL-SCNC: 21 MMOL/L (ref 22–29)
COLOR UR AUTO: COLORLESS
CREAT SERPL-MCNC: 0.97 MG/DL (ref 0.55–1.02)
CREAT/UREA NIT SERPL: 11
CTP QC/QA: YES
EOSINOPHIL # BLD AUTO: 0.08 X10(3)/MCL (ref 0–0.9)
EOSINOPHIL NFR BLD AUTO: 1.4 %
ERYTHROCYTE [DISTWIDTH] IN BLOOD BY AUTOMATED COUNT: 12.4 % (ref 11.5–17)
GFR SERPLBLD CREATININE-BSD FMLA CKD-EPI: >60 ML/MIN/1.73/M2
GLOBULIN SER-MCNC: 4.2 GM/DL (ref 2.4–3.5)
GLUCOSE SERPL-MCNC: 100 MG/DL (ref 74–100)
GLUCOSE UR QL STRIP: NORMAL
HCT VFR BLD AUTO: 41.2 % (ref 37–47)
HGB BLD-MCNC: 13.3 G/DL (ref 12–16)
HGB UR QL STRIP: NEGATIVE
HOLD SPECIMEN: NORMAL
HOLD SPECIMEN: NORMAL
HYALINE CASTS #/AREA URNS LPF: ABNORMAL /LPF
IMM GRANULOCYTES # BLD AUTO: 0.01 X10(3)/MCL (ref 0–0.04)
IMM GRANULOCYTES NFR BLD AUTO: 0.2 %
KETONES UR QL STRIP: NEGATIVE
LEUKOCYTE ESTERASE UR QL STRIP: NEGATIVE
LIPASE SERPL-CCNC: 28 U/L
LYMPHOCYTES # BLD AUTO: 2.97 X10(3)/MCL (ref 0.6–4.6)
LYMPHOCYTES NFR BLD AUTO: 51 %
MAGNESIUM SERPL-MCNC: 2 MG/DL (ref 1.6–2.6)
MCH RBC QN AUTO: 29.2 PG (ref 27–31)
MCHC RBC AUTO-ENTMCNC: 32.3 G/DL (ref 33–36)
MCV RBC AUTO: 90.4 FL (ref 80–94)
MONOCYTES # BLD AUTO: 0.49 X10(3)/MCL (ref 0.1–1.3)
MONOCYTES NFR BLD AUTO: 8.4 %
NEUTROPHILS # BLD AUTO: 2.23 X10(3)/MCL (ref 2.1–9.2)
NEUTROPHILS NFR BLD AUTO: 38.3 %
NITRITE UR QL STRIP: NEGATIVE
NRBC BLD AUTO-RTO: 0 %
PH UR STRIP: 6.5 [PH]
PLATELET # BLD AUTO: 290 X10(3)/MCL (ref 130–400)
PMV BLD AUTO: 10 FL (ref 7.4–10.4)
POTASSIUM SERPL-SCNC: 3.5 MMOL/L (ref 3.5–5.1)
PROT SERPL-MCNC: 7.8 GM/DL (ref 6.4–8.3)
PROT UR QL STRIP: NEGATIVE
RBC # BLD AUTO: 4.56 X10(6)/MCL (ref 4.2–5.4)
RBC #/AREA URNS AUTO: ABNORMAL /HPF
SODIUM SERPL-SCNC: 141 MMOL/L (ref 136–145)
SP GR UR STRIP.AUTO: 1.01 (ref 1–1.03)
SQUAMOUS #/AREA URNS LPF: ABNORMAL /HPF
UROBILINOGEN UR STRIP-ACNC: NORMAL
WBC # BLD AUTO: 5.82 X10(3)/MCL (ref 4.5–11.5)
WBC #/AREA URNS AUTO: ABNORMAL /HPF

## 2025-08-18 PROCEDURE — 81003 URINALYSIS AUTO W/O SCOPE: CPT

## 2025-08-18 PROCEDURE — 85025 COMPLETE CBC W/AUTO DIFF WBC: CPT

## 2025-08-18 PROCEDURE — 83735 ASSAY OF MAGNESIUM: CPT

## 2025-08-18 PROCEDURE — 83690 ASSAY OF LIPASE: CPT

## 2025-08-18 PROCEDURE — 25000003 PHARM REV CODE 250

## 2025-08-18 PROCEDURE — 99283 EMERGENCY DEPT VISIT LOW MDM: CPT

## 2025-08-18 PROCEDURE — 81025 URINE PREGNANCY TEST: CPT

## 2025-08-18 PROCEDURE — 80053 COMPREHEN METABOLIC PANEL: CPT

## 2025-08-18 RX ORDER — ONDANSETRON 4 MG/1
4 TABLET, ORALLY DISINTEGRATING ORAL
Status: COMPLETED | OUTPATIENT
Start: 2025-08-18 | End: 2025-08-18

## 2025-08-18 RX ORDER — ONDANSETRON 4 MG/1
4 TABLET, FILM COATED ORAL EVERY 6 HOURS
Qty: 12 TABLET | Refills: 0 | Status: SHIPPED | OUTPATIENT
Start: 2025-08-18

## 2025-08-18 RX ADMIN — ONDANSETRON 4 MG: 4 TABLET, ORALLY DISINTEGRATING ORAL at 12:08
